# Patient Record
Sex: MALE | Race: WHITE | NOT HISPANIC OR LATINO
[De-identification: names, ages, dates, MRNs, and addresses within clinical notes are randomized per-mention and may not be internally consistent; named-entity substitution may affect disease eponyms.]

---

## 2021-09-02 ENCOUNTER — APPOINTMENT (OUTPATIENT)
Dept: PULMONOLOGY | Facility: CLINIC | Age: 66
End: 2021-09-02
Payer: MEDICARE

## 2021-09-02 VITALS
BODY MASS INDEX: 33.64 KG/M2 | SYSTOLIC BLOOD PRESSURE: 150 MMHG | HEART RATE: 84 BPM | DIASTOLIC BLOOD PRESSURE: 80 MMHG | HEIGHT: 70 IN | WEIGHT: 235 LBS | OXYGEN SATURATION: 98 %

## 2021-09-02 DIAGNOSIS — Z01.818 ENCOUNTER FOR OTHER PREPROCEDURAL EXAMINATION: ICD-10-CM

## 2021-09-02 PROCEDURE — 99204 OFFICE O/P NEW MOD 45 MIN: CPT

## 2021-09-02 RX ORDER — CHROMIUM 200 MCG
TABLET ORAL
Refills: 0 | Status: ACTIVE | COMMUNITY

## 2021-09-02 RX ORDER — LOSARTAN POTASSIUM 100 MG/1
TABLET, FILM COATED ORAL
Refills: 0 | Status: ACTIVE | COMMUNITY

## 2021-09-02 RX ORDER — LORAZEPAM 2 MG/1
TABLET ORAL
Refills: 0 | Status: ACTIVE | COMMUNITY

## 2021-09-02 RX ORDER — PNV NO.95/FERROUS FUM/FOLIC AC 28MG-0.8MG
TABLET ORAL
Refills: 0 | Status: ACTIVE | COMMUNITY

## 2021-09-02 RX ORDER — NAPROXEN SODIUM 220 MG
TABLET ORAL
Refills: 0 | Status: ACTIVE | COMMUNITY

## 2021-09-02 RX ORDER — PREGABALIN 300 MG/1
CAPSULE ORAL
Refills: 0 | Status: ACTIVE | COMMUNITY

## 2021-09-02 RX ORDER — BUPROPION HYDROCHLORIDE 100 MG/1
TABLET, FILM COATED ORAL
Refills: 0 | Status: ACTIVE | COMMUNITY

## 2021-09-02 RX ORDER — AMLODIPINE BESYLATE 5 MG/1
TABLET ORAL
Refills: 0 | Status: ACTIVE | COMMUNITY

## 2021-09-02 RX ORDER — HYDROCHLOROTHIAZIDE 12.5 MG/1
TABLET ORAL
Refills: 0 | Status: ACTIVE | COMMUNITY

## 2021-09-02 RX ORDER — OMEPRAZOLE 40 MG/1
CAPSULE, DELAYED RELEASE ORAL
Refills: 0 | Status: ACTIVE | COMMUNITY

## 2021-09-02 RX ORDER — ZOLPIDEM TARTRATE 5 MG/1
TABLET, FILM COATED ORAL
Refills: 0 | Status: ACTIVE | COMMUNITY

## 2021-09-02 NOTE — PROCEDURE
[FreeTextEntry1] : [place]\par PATIENT NAME: Hugh Kim\par PATIENT PHONE NUMBER:\par PATIENT ID: [id number]\par : 1955\par DATE OF EXAM: 2021\par R. Phys. Name: Bandar Walters\par R. Phys. Address: [address] [place] [address]\par R. Phys. Phone: [phone]\par CT-CHEST ABDOMEN AND PELVIS POST IV CONTRAST\par \par HISTORY: R59.1 Generalized Enlarged Lymph Nodes Z87.891 Smoker prior\par R93.89 Abnormal findings on dx imaging of oth body structures\par \par \par Technique: CT scan of the chest abdomen and pelvis was performed following oral\par and during IV contrast administration of 80cc Omnipaque 350. This study was\par performed using automatic exposure control and an iterative reconstruction\par technique (radiation dose reduction software) to obtain a diagnostic image\par quality scan with patient dose as low as reasonably achievable. The mA and kV\par were adjusted according to patient size. The administered radiation dose was\par 17.61 mSv.\par \par Per ACR guidelines and [facility] policy, a creatinine level test was\par performed prior to this exam. Results were as follows:\par Creatinine 0.8.\par \par COMPARISON: Calcium scoring chest CT 8/3/2021. No prior abdominal imaging\par studies for comparison at the time of review.\par \par FINDINGS:\par \par THYROID: The visualized thyroid gland is unremarkable.\par \par LYMPH NODES: Again noted are multiple enlarged mediastinal and bilateral hilar\par nodes, suggestive of sarcoidosis.\par \par HEART/VASCULAR STRUCTURES: The heart is not enlarged. There is no pericardial\par effusion. Normal caliber of the thoracic aorta and pulmonary artery.\par \par LUNGS: 1.8 cm subpleural nodule in the right lower lobe containing central\par calcification (series 5 image 340), suggestive of a granuloma (series 5 image\par 340), and several additional punctate nodules. No evidence of infiltrate,\par bronchiectasis, obstructive or interstitial disease.\par \par \par PLEURA: There is no pleural effusion.\par \par LIVER: Fatty liver containing a cyst in segment 2 and focal steatosis in\par segment 4, without suspicious lesion.\par \par GALLBLADDER/BILIARY TREE: No radiopaque stone, gallbladder wall thickening or\par biliary dilatation.\par \par SPLEEN: Normal size spleen with a focal lesion.\par \par PANCREAS: Unremarkable\par \par ADRENAL GLANDS: Unremarkable\par \par KIDNEYS/URETERS/BLADDER: There are approximately 2 punctate stones in each\par kidney, and bilateral cysts that are larger and more numerous in left kidney\par compared with the right kidney, without suspicious lesion or hydronephrosis.\par \par REPRODUCTIVE ORGANS: Unremarkable\par \par BOWEL/MESENTERY/OMENTUM: Diverticulosis.\par \par LYMPHADENOPATHY/RETROPERITONEUM: There is no pathologic adenopathy.\par \par VASCULATURE: The major blood vessels in the field-of-view are patent, without\par evidence of aneurysm or dissection.\par \par PERITONEUM:There is no free air or ascites.\par \par BONES: There are no suspicious osseous lesions.\par \par IMPRESSION:\par \par \par Mediastinal and bilateral hilar lymphadenopathy suggestive of sarcoidosis. No\par lymphadenopathy in the abdomen or pelvis.\par \par 1.8 cm right lower lobe lung nodule containing central calcification, suggestive\par of a granuloma.\par \par Signed by: Omero Antunez MD\par Signed Date: 2021 3:51 PM EDT\par \par \par \par SIGNED BY: Omero Antunez M.D., Ext. [phone] 03:51 PM

## 2021-09-02 NOTE — HISTORY OF PRESENT ILLNESS
[Former] : former [TextBox_4] : 65M PMH anxiety, HTN, HLD, GERD, obesity who presents for initial pulmonary evaluation of abnormal CT. CT at  (8/18/21) showed mediastinal and bilateral hilar lymphadenopathy suggestive of sarcoidosis, as well as 1.8cm RLL nodule containing calcification, suggestive of a granuloma. Had an ACE-level of 32 (WNL). He saw Dr. Arboleda about 12 years ago, also found with a lung nodule, which was followed for several years and was told everything was stable. No cough. No SOB. No chest pain. No blurry vision. No skin rashes. No dysuria. No fevers, no chills. No N/V/D, abdominal pain. No unintended weight changes. No night sweats or chills. \par \par Father - heart disease\par Mother - brain tumor\par Wife - Multiple sclerosis [TextBox_11] : 1 [TextBox_13] : 25 [YearQuit] : 1995

## 2022-08-18 ENCOUNTER — LABORATORY RESULT (OUTPATIENT)
Age: 67
End: 2022-08-18

## 2022-08-18 ENCOUNTER — APPOINTMENT (OUTPATIENT)
Dept: UROLOGY | Facility: CLINIC | Age: 67
End: 2022-08-18

## 2022-08-18 VITALS
WEIGHT: 235 LBS | HEIGHT: 70 IN | TEMPERATURE: 97.3 F | SYSTOLIC BLOOD PRESSURE: 140 MMHG | DIASTOLIC BLOOD PRESSURE: 80 MMHG | BODY MASS INDEX: 33.64 KG/M2

## 2022-08-18 DIAGNOSIS — N52.9 MALE ERECTILE DYSFUNCTION, UNSPECIFIED: ICD-10-CM

## 2022-08-18 DIAGNOSIS — F52.21 MALE ERECTILE DISORDER: ICD-10-CM

## 2022-08-18 DIAGNOSIS — N13.8 BENIGN PROSTATIC HYPERPLASIA WITH LOWER URINARY TRACT SYMPMS: ICD-10-CM

## 2022-08-18 DIAGNOSIS — N40.1 BENIGN PROSTATIC HYPERPLASIA WITH LOWER URINARY TRACT SYMPMS: ICD-10-CM

## 2022-08-18 DIAGNOSIS — Z12.5 ENCOUNTER FOR SCREENING FOR MALIGNANT NEOPLASM OF PROSTATE: ICD-10-CM

## 2022-08-18 PROCEDURE — 99204 OFFICE O/P NEW MOD 45 MIN: CPT | Mod: 25

## 2022-08-18 PROCEDURE — 51798 US URINE CAPACITY MEASURE: CPT

## 2022-08-18 RX ORDER — ALFUZOSIN HYDROCHLORIDE 10 MG/1
10 TABLET, EXTENDED RELEASE ORAL
Qty: 30 | Refills: 5 | Status: ACTIVE | COMMUNITY
Start: 2022-08-18 | End: 1900-01-01

## 2022-08-18 NOTE — HISTORY OF PRESENT ILLNESS
[FreeTextEntry1] : 66 year old male presents for lower urinary tract symptoms. \par Reports weak stream, urinates 3-4 x or so during the day. Nocturia of 3 x. \par Endorses post void dribbling. No urinary incontinence. \par Denies hesitancy, straining, intermittency, urgency, sense of incomplete emptying.\par Denies dysuria, hematuria, lower abdominal or flank pain, fever, chills or rigors.\par Rates erections 2/5. With Sildenafil 50 mg gets 4/5 response. Complaining of low libido. \par Normal libido and ejaculation. \par No family history of Prostate cancer. \par \par Saw Dr Perdomo in the past for hematuria, had Cystoscopy, started on Flomax. Stopped due to retrograde ejaculation. \par

## 2022-08-18 NOTE — LETTER BODY
[Dear  ___] : Dear  [unfilled], [Consult Letter:] : I had the pleasure of evaluating your patient, [unfilled]. [( Thank you for referring [unfilled] for consultation for _____ )] : Thank you for referring [unfilled] for consultation for [unfilled] [Please see my note below.] : Please see my note below. [Consult Closing:] : Thank you very much for allowing me to participate in the care of this patient.  If you have any questions, please do not hesitate to contact me. [Sincerely,] : Sincerely, [FreeTextEntry3] : Billy Srivastava MD\par  of Urology\par [facility]\par \par [facility]\par Offices:\par [address] [facility] [address], [facility]\par [address] [address]\par [address] [facility], [address]\par \par TEL: [phone]\par FAX: [phone]

## 2022-08-18 NOTE — PHYSICAL EXAM
[Scrotum] : the scrotum was normal [Epididymis] : the epididymides were normal [Testes Tenderness] : no tenderness of the testes [Testes Mass (___cm)] : there were no testicular masses [Prostate Tenderness] : the prostate was not tender [No Prostate Nodules] : no prostate nodules [Prostate Size ___ (0-4)] : prostate size [unfilled] (scale: 0-4) [Normal Appearance] : normal appearance [General Appearance - In No Acute Distress] : no acute distress [] : no respiratory distress [Abdomen Soft] : soft [Abdomen Tenderness] : non-tender [FreeTextEntry1] : distal hypospadiac meatus, non circumcised penis  [Normal Station and Gait] : the gait and station were normal for the patient's age [Skin Color & Pigmentation] : normal skin color and pigmentation [No Focal Deficits] : no focal deficits [Oriented To Time, Place, And Person] : oriented to person, place, and time

## 2022-08-18 NOTE — ASSESSMENT
[FreeTextEntry1] : Benign Prostatic Hyperplasia:\par PVR: 4 ml. \par Will get Urinalysis with reflex Urine culture. \par Discussed treatment options. Will like to try Alfuzosin. Explained common side effects: nasal congestion, cough, muscle pain, back pain, dizziness, orthostatic hypotension, somnolence, retrograde ejaculation, decreased libido and erectile dysfunction. \par \par Erectile dysfunction:\par Reviewed pathophysiology and differential diagnosis of erectile dysfunction with the patient. Discussed lifestyle changes. \par The patient was made aware that the current therapies for erectile dysfunction consisting of:\par Oral phosphodiesterase type 5 inhibitors like Viagra/Sildenafil and Cialis/Tadalafil. \par Vacuum erection devices. \par Intraurethral suppository. \par Intracavernous vasoactive drug injection.\par Penile prosthesis implantation.\par Risks and benefits of each were discussed. All questions were answered.\par Will continue Sildenafil. \par \par PSA Screening:\par Discussed PSA screening and latest recommendations/guidelines- USPTF and AUA. \par Will get results from PCP. \par \par Return to office in 2 months or sooner if any issues: will do Uroflo/PVR.

## 2022-08-19 LAB
APPEARANCE: ABNORMAL
BILIRUBIN URINE: NEGATIVE
BLOOD URINE: NEGATIVE
COLOR: YELLOW
GLUCOSE QUALITATIVE U: NEGATIVE
KETONES URINE: NEGATIVE
LEUKOCYTE ESTERASE URINE: NEGATIVE
NITRITE URINE: NEGATIVE
PH URINE: 7.5
PROTEIN URINE: ABNORMAL
SPECIFIC GRAVITY URINE: 1.03
UROBILINOGEN URINE: NORMAL

## 2022-08-30 ENCOUNTER — NON-APPOINTMENT (OUTPATIENT)
Age: 67
End: 2022-08-30

## 2022-09-12 ENCOUNTER — RX CHANGE (OUTPATIENT)
Age: 67
End: 2022-09-12

## 2022-10-18 ENCOUNTER — APPOINTMENT (OUTPATIENT)
Dept: UROLOGY | Facility: CLINIC | Age: 67
End: 2022-10-18

## 2024-02-02 ENCOUNTER — OFFICE (OUTPATIENT)
Dept: URBAN - METROPOLITAN AREA CLINIC 12 | Facility: CLINIC | Age: 69
Setting detail: OPHTHALMOLOGY
End: 2024-02-02
Payer: MEDICARE

## 2024-02-02 DIAGNOSIS — H25.13: ICD-10-CM

## 2024-02-02 DIAGNOSIS — H16.223: ICD-10-CM

## 2024-02-02 DIAGNOSIS — H40.013: ICD-10-CM

## 2024-02-02 PROCEDURE — 99204 OFFICE O/P NEW MOD 45 MIN: CPT | Performed by: STUDENT IN AN ORGANIZED HEALTH CARE EDUCATION/TRAINING PROGRAM

## 2024-02-02 PROCEDURE — 92133 CPTRZD OPH DX IMG PST SGM ON: CPT | Performed by: STUDENT IN AN ORGANIZED HEALTH CARE EDUCATION/TRAINING PROGRAM

## 2024-02-02 ASSESSMENT — REFRACTION_AUTOREFRACTION
OD_CYLINDER: -0.25
OS_SPHERE: -2.00
OS_AXIS: 064
OS_CYLINDER: -1.25
OD_AXIS: 103
OD_SPHERE: -4.75

## 2024-02-02 ASSESSMENT — REFRACTION_CURRENTRX
OS_ADD: +2.00
OS_SPHERE: -1.50
OD_VPRISM_DIRECTION: BF
OS_CYLINDER: -1.00
OD_SPHERE: -4.00
OD_CYLINDER: -0.25
OD_ADD: +2.00
OD_OVR_VA: 20/
OD_AXIS: 046
OS_VPRISM_DIRECTION: BF
OS_OVR_VA: 20/
OS_AXIS: 087

## 2024-02-02 ASSESSMENT — LID EXAM ASSESSMENTS
OD_BLEPHARITIS: RUL 2+
OS_BLEPHARITIS: LUL 2+

## 2024-02-02 ASSESSMENT — SPHEQUIV_DERIVED
OS_SPHEQUIV: -2.625
OD_SPHEQUIV: -4.875

## 2024-02-02 ASSESSMENT — SUPERFICIAL PUNCTATE KERATITIS (SPK)
OD_SPK: 1+
OS_SPK: 1+

## 2024-02-02 ASSESSMENT — CONFRONTATIONAL VISUAL FIELD TEST (CVF)
OD_FINDINGS: FULL
OS_FINDINGS: FULL

## 2025-04-15 ENCOUNTER — APPOINTMENT (OUTPATIENT)
Dept: UROLOGY | Facility: CLINIC | Age: 70
End: 2025-04-15
Payer: MEDICARE

## 2025-04-15 VITALS
SYSTOLIC BLOOD PRESSURE: 108 MMHG | BODY MASS INDEX: 30.85 KG/M2 | HEART RATE: 77 BPM | WEIGHT: 215 LBS | DIASTOLIC BLOOD PRESSURE: 69 MMHG

## 2025-04-15 DIAGNOSIS — N40.1 BENIGN PROSTATIC HYPERPLASIA WITH LOWER URINARY TRACT SYMPMS: ICD-10-CM

## 2025-04-15 DIAGNOSIS — R97.20 ELEVATED PROSTATE, SPECIFIC ANTIGEN [PSA]: ICD-10-CM

## 2025-04-15 DIAGNOSIS — N13.8 BENIGN PROSTATIC HYPERPLASIA WITH LOWER URINARY TRACT SYMPMS: ICD-10-CM

## 2025-04-15 PROCEDURE — 99214 OFFICE O/P EST MOD 30 MIN: CPT

## 2025-04-17 DIAGNOSIS — R31.29 OTHER MICROSCOPIC HEMATURIA: ICD-10-CM

## 2025-04-17 LAB
APPEARANCE: CLEAR
BACTERIA UR CULT: NORMAL
BACTERIA: NEGATIVE /HPF
BILIRUBIN URINE: NEGATIVE
BLOOD URINE: NEGATIVE
CALCIUM OXALATE CRYSTALS: PRESENT
CAST: 0 /LPF
COLOR: NORMAL
EPITHELIAL CELLS: 0 /HPF
GLUCOSE QUALITATIVE U: NEGATIVE MG/DL
KETONES URINE: ABNORMAL MG/DL
LEUKOCYTE ESTERASE URINE: ABNORMAL
MICROSCOPIC-UA: NORMAL
NITRITE URINE: NEGATIVE
PH URINE: 6.5
PROTEIN URINE: 30 MG/DL
RED BLOOD CELLS URINE: 5 /HPF
REVIEW: NORMAL
SPECIFIC GRAVITY URINE: >1.03
UROBILINOGEN URINE: 1 MG/DL
WHITE BLOOD CELLS URINE: 0 /HPF

## 2025-04-18 RX ORDER — TAMSULOSIN HYDROCHLORIDE 0.4 MG/1
0.4 CAPSULE ORAL
Qty: 30 | Refills: 2 | Status: ACTIVE | COMMUNITY
Start: 2025-04-18 | End: 1900-01-01

## 2025-05-23 DIAGNOSIS — N13.8 BENIGN PROSTATIC HYPERPLASIA WITH LOWER URINARY TRACT SYMPMS: ICD-10-CM

## 2025-05-23 DIAGNOSIS — N40.1 BENIGN PROSTATIC HYPERPLASIA WITH LOWER URINARY TRACT SYMPMS: ICD-10-CM

## 2025-07-15 ENCOUNTER — APPOINTMENT (OUTPATIENT)
Dept: UROLOGY | Facility: CLINIC | Age: 70
End: 2025-07-15

## 2025-07-15 VITALS
RESPIRATION RATE: 16 BRPM | BODY MASS INDEX: 30.78 KG/M2 | HEART RATE: 85 BPM | HEIGHT: 70 IN | WEIGHT: 215 LBS | OXYGEN SATURATION: 98 % | SYSTOLIC BLOOD PRESSURE: 138 MMHG | DIASTOLIC BLOOD PRESSURE: 80 MMHG

## 2025-07-15 PROBLEM — N32.81 OVERACTIVE BLADDER: Status: ACTIVE | Noted: 2025-07-15

## 2025-07-15 PROBLEM — E83.50 DISORDER OF CALCIUM METABOLISM: Status: ACTIVE | Noted: 2025-07-15

## 2025-07-15 PROCEDURE — 51741 ELECTRO-UROFLOWMETRY FIRST: CPT

## 2025-07-15 PROCEDURE — 51798 US URINE CAPACITY MEASURE: CPT

## 2025-07-15 PROCEDURE — 99214 OFFICE O/P EST MOD 30 MIN: CPT | Mod: 25

## 2025-07-15 RX ORDER — VIBEGRON 75 MG/1
75 TABLET, FILM COATED ORAL
Qty: 90 | Refills: 1 | Status: ACTIVE | COMMUNITY
Start: 2025-07-15 | End: 1900-01-01

## 2025-07-28 PROBLEM — N28.1 RENAL CYST: Status: ACTIVE | Noted: 2025-07-28

## 2025-07-28 PROBLEM — N20.0 BILATERAL KIDNEY STONES: Status: ACTIVE | Noted: 2025-07-28

## 2025-07-30 RX ORDER — MIRABEGRON 50 MG/1
50 TABLET, EXTENDED RELEASE ORAL
Qty: 90 | Refills: 1 | Status: ACTIVE | COMMUNITY
Start: 2025-07-30 | End: 1900-01-01